# Patient Record
Sex: FEMALE | Race: WHITE | Employment: UNEMPLOYED | URBAN - METROPOLITAN AREA
[De-identification: names, ages, dates, MRNs, and addresses within clinical notes are randomized per-mention and may not be internally consistent; named-entity substitution may affect disease eponyms.]

---

## 2023-02-06 ENCOUNTER — OFFICE VISIT (OUTPATIENT)
Dept: ENT CLINIC | Age: 53
End: 2023-02-06
Payer: COMMERCIAL

## 2023-02-06 VITALS
HEIGHT: 63 IN | BODY MASS INDEX: 38.8 KG/M2 | WEIGHT: 219 LBS | SYSTOLIC BLOOD PRESSURE: 140 MMHG | DIASTOLIC BLOOD PRESSURE: 89 MMHG | HEART RATE: 85 BPM | TEMPERATURE: 97.7 F

## 2023-02-06 DIAGNOSIS — R13.10 DYSPHAGIA, UNSPECIFIED TYPE: ICD-10-CM

## 2023-02-06 DIAGNOSIS — K11.5 SIALOLITHIASIS OF SUBMANDIBULAR GLAND: Primary | ICD-10-CM

## 2023-02-06 DIAGNOSIS — R60.9 SUBMANDIBULAR GLAND SWELLING: ICD-10-CM

## 2023-02-06 PROCEDURE — 99203 OFFICE O/P NEW LOW 30 MIN: CPT | Performed by: STUDENT IN AN ORGANIZED HEALTH CARE EDUCATION/TRAINING PROGRAM

## 2023-02-06 PROCEDURE — 31575 DIAGNOSTIC LARYNGOSCOPY: CPT | Performed by: STUDENT IN AN ORGANIZED HEALTH CARE EDUCATION/TRAINING PROGRAM

## 2023-02-06 NOTE — PROGRESS NOTES
[address] SURGERY  CONSULT      Melo Herbert (:  1970) is a 45 y.o. female, here for evaluation of the following chief complaint(s): Other (Left side lymph gland swollen -- recurrent)      ASSESSMENT/PLAN:  1. Sialolithiasis of submandibular gland  -     CT SOFT TISSUE NECK WO CONTRAST; Future  2. Submandibular gland swelling  3. Dysphagia, unspecified type      This is a very pleasant 45 y.o. female here today for evaluation of the the above-noted complaints. The patient has fluctuating left-sided neck mass. Physical exam findings and history are most consistent with sialolithiasis with resultant sialoadenitis, versus less likely branchial cleft cyst or thyroglossal duct cyst, or head neck cancer. We discussed conservative management of sialolithiasis and sialoadenitis. Encouraged the use of sialagogues, gland massage and increased hydration. I will order a CT scan of her neck. She has a reported allergy to shrimp, but can tolerate other shellfish. We will hold off on ordering contrast with this for scan, but if the scan does not show a sialolith, may need to order it with contrast.    Medical Decision Making: The following items were considered in medical decision making:  Independent review of images  Review / order clinical lab tests  Review / order radiology tests  Decision to obtain old records  Review and summation of old records as accessed through [facility] if applicable    SUBJECTIVE/OBJECTIVE:  HPI    Melo Herbert is here today for evaluation of swelling and dysphagia. Patient states that this has been going on over the last several months. She states that the left side of her neck below her mandible will become swollen and tender to palpation. When it swells, she feels as if she has difficulty swallowing. She has received 2 rounds of antibiotics for this, which seems to help with her symptoms.   She has never had anything like this in the past.  She has never had surgery of the head and neck. She denies foul tasting drainage in her mouth, shortness of breath, other neck masses, fevers, chills, night sweats. She does not smoke. Rare social alcohol use. REVIEW OF SYSTEMS  The following systems were reviewed and revealed the following in addition to any already discussed in the HPI:    PHYSICAL EXAM    GENERAL: No acute distress, alert and oriented, no hoarseness, strong voice  EYES: EOMI, Anti-icteric  HENT:   Head: Normocephalic and atraumatic. Face:  Symmetric, facial nerve intact  Right Ear: Normal external ear, normal external auditory canal, intact tympanic membrane with normal mobility and aerated middle ear  Left Ear: Normal external ear, normal external auditory canal, intact tympanic membrane with normal mobility and aerated middle ear  Mouth/Oral Cavity:  normal lips, Uvula is midline, no mucosal lesions, no trismus, normal dentition, normal salivary quality/flow  Oropharynx/Larynx:  normal oropharynx, unremarkable tonsils  Nose:Normal external nasal appearance. Normal mucosa   NECK: Normal range of motion, no thyromegaly, trachea is midline, no lymphadenopathy, no neck masses, no crepitus  CHEST: Normal respiratory effort, no retractions, breathing comfortably  SKIN: No rashes, normal appearing skin, no evidence of skin lesions/tumors  Neuro:  cranial nerve II-XII intact; normal gait  Cardio:  no edema    There is slight fullness of the left submandibular gland in comparison to the right. I was able to express limited clear secretions from [facility] duct on the left, normal expression of saliva on the right. PROCEDURE  Flexible Laryngoscopy CPT Code [id number]    Pre op: Dysphagia, left neck mass.    Post op: Same  Procedure : Flexible Laryngoscopy  Surgeon: Polly Cordova MD  Anesthesia: Afrin with 4% lidocaine  Indication: Laryngeal mirror examination was not tolerated due to gag reflex  Description:  The scope was passed along the floor of the left naris to the level of the larynx. There was no evidence of concerning masses or lesions of the base of tongue, vallecula, epiglottis, aryepiglottic folds, arytenoids, false vocal folds, true vocal folds, or pyriform sinuses. True vocal folds exhibited symmetric motion bilaterally without evidence of paralysis or paresis. The scope was removed. The patient tolerated the procedure without difficulty. There were no complications. Pertinent findings: No evidence of lesions on exam.      This note was generated completely or in part utilizing Dragon dictation speech recognition software. Occasionally, words are mistranscribed and despite editing, the text may contain inaccuracies due to incorrect word recognition. If further clarification is needed please contact the office at [phone]. An electronic signature was used to authenticate this note.     --Melo Barrientos MD